# Patient Record
Sex: FEMALE | Race: WHITE | Employment: UNEMPLOYED | URBAN - METROPOLITAN AREA
[De-identification: names, ages, dates, MRNs, and addresses within clinical notes are randomized per-mention and may not be internally consistent; named-entity substitution may affect disease eponyms.]

---
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
## 2019-07-10 ENCOUNTER — OFFICE VISIT (OUTPATIENT)
Dept: FAMILY MEDICINE CLINIC | Age: 19
End: 2019-07-10
Payer: COMMERCIAL

## 2019-07-10 VITALS
OXYGEN SATURATION: 97 % | HEIGHT: 65 IN | TEMPERATURE: 98.2 F | WEIGHT: 187 LBS | HEART RATE: 82 BPM | BODY MASS INDEX: 31.16 KG/M2

## 2019-07-10 DIAGNOSIS — S05.01XA ABRASION OF RIGHT CORNEA, INITIAL ENCOUNTER: Primary | ICD-10-CM

## 2019-07-10 PROCEDURE — 99213 OFFICE O/P EST LOW 20 MIN: CPT | Performed by: PHYSICIAN ASSISTANT

## 2019-07-10 RX ORDER — ERYTHROMYCIN 5 MG/G
OINTMENT OPHTHALMIC
Qty: 1 TUBE | Refills: 0 | Status: SHIPPED
Start: 2019-07-10 | End: 2020-02-14

## 2019-07-10 SDOH — HEALTH STABILITY: MENTAL HEALTH: HOW OFTEN DO YOU HAVE A DRINK CONTAINING ALCOHOL?: NEVER

## 2020-02-14 ENCOUNTER — OFFICE VISIT (OUTPATIENT)
Dept: FAMILY MEDICINE CLINIC | Age: 20
End: 2020-02-14
Payer: COMMERCIAL

## 2020-02-14 VITALS
BODY MASS INDEX: 28 KG/M2 | SYSTOLIC BLOOD PRESSURE: 108 MMHG | HEART RATE: 61 BPM | WEIGHT: 164 LBS | HEIGHT: 64 IN | DIASTOLIC BLOOD PRESSURE: 65 MMHG

## 2020-02-14 PROCEDURE — 99213 OFFICE O/P EST LOW 20 MIN: CPT | Performed by: FAMILY MEDICINE

## 2020-02-14 ASSESSMENT — PATIENT HEALTH QUESTIONNAIRE - PHQ9
SUM OF ALL RESPONSES TO PHQ QUESTIONS 1-9: 1
SUM OF ALL RESPONSES TO PHQ9 QUESTIONS 1 & 2: 1
2. FEELING DOWN, DEPRESSED OR HOPELESS: 1
1. LITTLE INTEREST OR PLEASURE IN DOING THINGS: 0
SUM OF ALL RESPONSES TO PHQ QUESTIONS 1-9: 1

## 2020-08-17 ENCOUNTER — OFFICE VISIT (OUTPATIENT)
Dept: FAMILY MEDICINE CLINIC | Age: 20
End: 2020-08-17
Payer: COMMERCIAL

## 2020-08-17 VITALS
SYSTOLIC BLOOD PRESSURE: 118 MMHG | HEIGHT: 64 IN | WEIGHT: 153 LBS | OXYGEN SATURATION: 98 % | RESPIRATION RATE: 16 BRPM | DIASTOLIC BLOOD PRESSURE: 74 MMHG | TEMPERATURE: 98 F | BODY MASS INDEX: 26.12 KG/M2 | HEART RATE: 78 BPM

## 2020-08-17 PROCEDURE — 99213 OFFICE O/P EST LOW 20 MIN: CPT | Performed by: FAMILY MEDICINE

## 2020-08-17 RX ORDER — BUSPIRONE HYDROCHLORIDE 7.5 MG/1
7.5 TABLET ORAL 2 TIMES DAILY
Qty: 60 TABLET | Refills: 0 | Status: SHIPPED
Start: 2020-08-17 | End: 2020-08-17

## 2020-08-17 RX ORDER — BUSPIRONE HYDROCHLORIDE 7.5 MG/1
7.5 TABLET ORAL 2 TIMES DAILY
Qty: 60 TABLET | Refills: 0 | Status: SHIPPED | OUTPATIENT
Start: 2020-08-17 | End: 2020-09-16

## 2020-08-17 ASSESSMENT — ENCOUNTER SYMPTOMS
RHINORRHEA: 0
BACK PAIN: 0
SORE THROAT: 0
DIARRHEA: 0
WHEEZING: 0
NAUSEA: 0
ABDOMINAL PAIN: 0
COUGH: 0
VOMITING: 0
SHORTNESS OF BREATH: 0
CONSTIPATION: 0

## 2020-08-17 NOTE — PROGRESS NOTES
[facility]fan[facility] [address]  Precepting Note    Subjective: Anxiety  Has mood swings, anxious with school  Some flash backs  Some sadness and mad    ROS otherwise negative     Past medical, surgical, family and social history were reviewed, non-contributory, and unchanged unless otherwise stated. Objective:    /74   Pulse 78   Temp 98 °F (36.7 °C) (Temporal)   Resp 16   Ht 5' 4\" (1.626 m)   Wt 153 lb (69.4 kg)   SpO2 98%   BMI 26.26 kg/m²     Exam is as noted by resident with the following changes, additions or corrections:    General:  NAD; alert & oriented x 3   Heart:  RRR, no murmurs, gallops, or rubs. Lungs:  CTA bilaterally, no wheeze, rales or rhonchi  Mood: guarded, no SI/HI    Assessment/Plan:  Anxiety: add Buspar  Refer to psychology       Attending Physician Statement  I have reviewed the chart, including any radiology or labs. I have discussed the case, including pertinent history and exam findings with the resident. I agree with the assessment, plan and orders as documented by the resident. Please refer to the resident note for additional information.       Electronically signed by Karissa Rivera MD on 8/17/2020 at 2:29 PM

## 2021-07-08 ENCOUNTER — OFFICE VISIT (OUTPATIENT)
Dept: FAMILY MEDICINE CLINIC | Age: 21
End: 2021-07-08
Payer: COMMERCIAL

## 2021-07-08 VITALS
TEMPERATURE: 98 F | DIASTOLIC BLOOD PRESSURE: 60 MMHG | HEIGHT: 65 IN | SYSTOLIC BLOOD PRESSURE: 101 MMHG | WEIGHT: 145.8 LBS | BODY MASS INDEX: 24.29 KG/M2 | HEART RATE: 83 BPM

## 2021-07-08 DIAGNOSIS — R14.0 ABDOMINAL BLOATING: ICD-10-CM

## 2021-07-08 DIAGNOSIS — K21.9 GASTROESOPHAGEAL REFLUX DISEASE, UNSPECIFIED WHETHER ESOPHAGITIS PRESENT: Primary | ICD-10-CM

## 2021-07-08 PROCEDURE — 99213 OFFICE O/P EST LOW 20 MIN: CPT | Performed by: FAMILY MEDICINE

## 2021-07-08 RX ORDER — PANTOPRAZOLE SODIUM 40 MG/1
40 TABLET, DELAYED RELEASE ORAL
Qty: 30 TABLET | Refills: 1 | Status: SHIPPED
Start: 2021-07-08 | End: 2021-10-08 | Stop reason: SDUPTHER

## 2021-07-08 ASSESSMENT — PATIENT HEALTH QUESTIONNAIRE - PHQ9
SUM OF ALL RESPONSES TO PHQ9 QUESTIONS 1 & 2: 0
SUM OF ALL RESPONSES TO PHQ QUESTIONS 1-9: 0
1. LITTLE INTEREST OR PLEASURE IN DOING THINGS: 0
SUM OF ALL RESPONSES TO PHQ QUESTIONS 1-9: 0
SUM OF ALL RESPONSES TO PHQ QUESTIONS 1-9: 0
2. FEELING DOWN, DEPRESSED OR HOPELESS: 0

## 2021-07-08 NOTE — PROGRESS NOTES
7/9/2021    Williams Clifford is a 24 y.o. female here for:    HPI:    Having chronic abdominal pain: Not having diarrhea but just more frequent stools. She used to have problems with constipation. Had new IUD placed less than a year ago but is having regular light menses. Bloating in the morning and she will have to use the bathroom multiple times in the morning before the bloating will resolve and she can continue with her day. She has noticed blood on the toilet paper 2 times but no bloody stools otherwise. She is having significant burning and sour taste mostly in the mornings but occasionally in the evenings as well    COVID Vaccine Status: [employer]  BP Readings from Last 3 Encounters:   07/08/21 101/60   08/17/20 118/74   02/14/20 108/65     Current Outpatient Medications   Medication Sig Dispense Refill    pantoprazole (PROTONIX) 40 MG tablet Take 1 tablet by mouth daily (with breakfast) 30 tablet 1    levonorgestrel (NORRIS) IUD 13.5 mg 1 each by Intrauterine route once       No current facility-administered medications for this visit.       Allergies   Allergen Reactions    Amoxicillin Rash       Past Medical & Surgical History:      Diagnosis Date    Acne vulgaris 3/2/2018    Normal T, mild elevation in DHEAS    Irregular menses     Mechanical low back pain 5/29/2014     Past Surgical History:   Procedure Laterality Date    ADENOIDECTOMY         Family History:      Problem Relation Age of Onset    Cancer Father         thyroid       Social History:  Social History     Tobacco Use    Smoking status: Never Smoker    Smokeless tobacco: Never Used   Substance Use Topics    Alcohol use: Never       Immunization History   Administered Date(s) Administered    DTaP 1999, 1999, 1999, 10/10/2000    HPV Quadrivalent (Gardasil) 07/03/2014, 09/03/2014, 02/26/2015    Hepatitis A 08/02/2016    Hepatitis B 1999, 1999, 1999    Hib, unspecified 1999, edema. Left lower leg: No edema. Skin:     General: Skin is warm and dry. Neurological:      General: No focal deficit present. Mental Status: She is alert. Assessment/Plan:    1. Gastroesophageal reflux disease, unspecified whether esophagitis present  A lot of the description of her symptoms appear to be reflux in nature  May be component of IBS given history of constipation and now with more frequent stools although not diarrhea  Does not appear to be having abdominal spasm that would warrant antispasmodic at this point but may benefit from the future  No overt GI bleeding that would warrant inflammatory bowel disease work-up with scope at this point but pending response to PPI may benefit from at least upper endoscopy in the future  - pantoprazole (PROTONIX) 40 MG tablet; Take 1 tablet by mouth daily (with breakfast)  Dispense: 30 tablet; Refill: 1    2. Abdominal bloating  As above  Patient's abdomen was soft and nondistended today      Follow up: 2 months after course of PPI, discussion of further work-up or treatment at that time pending response to PPI    Patient agrees with the above stated plan.     Ina Rios MD  PGY-2 Family Medicine

## 2021-07-08 NOTE — PROGRESS NOTES
S: 24 y.o. female with   Chief Complaint   Patient presents with    Abdominal Pain     stomach cramps everyday, also feels acid build up in her throat, been going on for about 2 months     Urinary Frequency     when she has a lot of liquid she has urgency in urinating     Rectal Bleeding     overa the 3 month span she has noticed blood on TP about 3 times. All since this stomach issue has been going on        MONTHs of diarrhea and burning/reflux sx. +blood in stool. Hx constipation in past.  Only blood on TP only not in stool. O: VS:  height is 5' 5\" (1.651 m) and weight is 145 lb 12.8 oz (66.1 kg). Her temporal temperature is 98 °F (36.7 °C). Her blood pressure is 101/60 and her pulse is 83. BP Readings from Last 3 Encounters:   07/08/21 101/60   08/17/20 118/74   02/14/20 108/65     See resident note      Impression/Plan:   1) GI sx - Treat reflux       Health Maintenance Due   Topic Date Due    Hepatitis C screen  Never done    Varicella vaccine (2 of 2 - 2-dose childhood series) 12/10/2009    HIV screen  Never done    Chlamydia screen  Never done    Hepatitis A vaccine (2 of 2 - 2-dose series) 02/02/2017    Cervical cancer screen  Never done         Attending Physician Statement  I have discussed the case, including pertinent history and exam findings with the resident. I agree with the documented assessment and plan.       Ioana Saldivar MD

## 2021-07-09 ASSESSMENT — ENCOUNTER SYMPTOMS
NAUSEA: 0
ABDOMINAL DISTENTION: 1
CONSTIPATION: 0
SHORTNESS OF BREATH: 0
WHEEZING: 0
BLOOD IN STOOL: 0
DIARRHEA: 0
ABDOMINAL PAIN: 1
TROUBLE SWALLOWING: 0
VOMITING: 0
COUGH: 0
SORE THROAT: 0

## 2021-10-08 ENCOUNTER — OFFICE VISIT (OUTPATIENT)
Dept: FAMILY MEDICINE CLINIC | Age: 21
End: 2021-10-08
Payer: COMMERCIAL

## 2021-10-08 VITALS
RESPIRATION RATE: 16 BRPM | TEMPERATURE: 98.6 F | BODY MASS INDEX: 24.16 KG/M2 | HEART RATE: 87 BPM | WEIGHT: 145 LBS | OXYGEN SATURATION: 100 % | SYSTOLIC BLOOD PRESSURE: 120 MMHG | HEIGHT: 65 IN | DIASTOLIC BLOOD PRESSURE: 79 MMHG

## 2021-10-08 DIAGNOSIS — K21.9 GASTROESOPHAGEAL REFLUX DISEASE, UNSPECIFIED WHETHER ESOPHAGITIS PRESENT: ICD-10-CM

## 2021-10-08 DIAGNOSIS — F41.9 ANXIETY: Primary | ICD-10-CM

## 2021-10-08 DIAGNOSIS — K62.5 RECTAL BLEEDING: ICD-10-CM

## 2021-10-08 PROCEDURE — 99213 OFFICE O/P EST LOW 20 MIN: CPT | Performed by: FAMILY MEDICINE

## 2021-10-08 RX ORDER — PANTOPRAZOLE SODIUM 40 MG/1
40 TABLET, DELAYED RELEASE ORAL
Qty: 30 TABLET | Refills: 1 | Status: SHIPPED
Start: 2021-10-08 | End: 2022-01-31

## 2021-10-08 RX ORDER — SERTRALINE HYDROCHLORIDE 25 MG/1
25 TABLET, FILM COATED ORAL DAILY
Qty: 30 TABLET | Refills: 3 | Status: SHIPPED
Start: 2021-10-08 | End: 2021-11-05 | Stop reason: SDUPTHER

## 2021-10-08 RX ORDER — HYDROXYZINE PAMOATE 25 MG/1
25 CAPSULE ORAL 3 TIMES DAILY PRN
Qty: 30 CAPSULE | Refills: 1 | Status: SHIPPED
Start: 2021-10-08 | End: 2021-11-05

## 2021-10-08 SDOH — ECONOMIC STABILITY: FOOD INSECURITY: WITHIN THE PAST 12 MONTHS, YOU WORRIED THAT YOUR FOOD WOULD RUN OUT BEFORE YOU GOT MONEY TO BUY MORE.: NEVER TRUE

## 2021-10-08 SDOH — ECONOMIC STABILITY: FOOD INSECURITY: WITHIN THE PAST 12 MONTHS, THE FOOD YOU BOUGHT JUST DIDN'T LAST AND YOU DIDN'T HAVE MONEY TO GET MORE.: NEVER TRUE

## 2021-10-08 ASSESSMENT — SOCIAL DETERMINANTS OF HEALTH (SDOH): HOW HARD IS IT FOR YOU TO PAY FOR THE VERY BASICS LIKE FOOD, HOUSING, MEDICAL CARE, AND HEATING?: NOT HARD AT ALL

## 2021-10-08 ASSESSMENT — ENCOUNTER SYMPTOMS
ABDOMINAL PAIN: 0
DIARRHEA: 0
VOMITING: 0
COUGH: 0
NAUSEA: 0
CONSTIPATION: 0
SHORTNESS OF BREATH: 0

## 2021-10-08 NOTE — PROGRESS NOTES
Subjective:    Conchis Espinoza is here for rectal bleeding over the past two weeks. She has had bright red bleeding. She also has had some vomiting and nausea after eating a pepperoni roll. She is off of Omeprazole. She has no pain associated with the bleeding. ROS: Otherwise negative    Patient Active Problem List   Diagnosis    Irregular menses    Anxiety    Panic disorder    Acne vulgaris       Past medical, surgical, family and social history were reviewed, non-contributory, and unchanged unless otherwise stated. Objective:    /79   Pulse 87   Temp 98.6 °F (37 °C) (Temporal)   Resp 16   Ht 5' 5\" (1.651 m)   Wt 145 lb (65.8 kg)   SpO2 100%   BMI 24.13 kg/m²     Exam is as noted by resident with the following changes, additions or corrections:      Assessment/Plan:        Conchis Espinoza was seen today for rectal bleeding and anxiety. Diagnoses and all orders for this visit:    Anxiety  -     sertraline (ZOLOFT) 25 MG tablet; Take 1 tablet by mouth daily  -     hydrOXYzine (VISTARIL) 25 MG capsule; Take 1 capsule by mouth 3 times daily as needed for Anxiety    Gastroesophageal reflux disease, unspecified whether esophagitis present  -     pantoprazole (PROTONIX) 40 MG tablet; Take 1 tablet by mouth daily (with breakfast)    Rectal bleeding           Attending Physician Statement    I have reviewed the chart, including any radiology or labs. I have discussed the case, including pertinent history and exam findings with the resident. I agree with the assessment, plan and orders as documented by the resident. Please refer to the resident note for additional information.       Electronically signed by Sarah Doty DO on 10/14/2021 at 8:27 AM

## 2021-10-08 NOTE — PROGRESS NOTES
02/26/2015    Hepatitis A 08/02/2016    Hepatitis B 1999, 1999, 1999    Hib, unspecified 1999, 1999, 1999, 07/25/2000    MMR 07/25/2000, 05/24/2004    Meningococcal MCV4P (Menactra) 09/15/2011, 07/15/2015    Pneumococcal Conjugate 13-valent Clarisa Bale) 07/25/2000, 10/10/2000    Polio IPV (IPOL) 1999, 1999, 10/10/2000    Rotavirus Vaccine 1999    Tdap (Boostrix, Adacel) 09/15/2011    Varicella (Varivax) 04/25/2000       Review of Systems   Constitutional: Negative for appetite change and fatigue. HENT: Negative for congestion and sneezing. Respiratory: Negative for cough and shortness of breath. Cardiovascular: Negative for chest pain. Gastrointestinal: Positive for blood in stool. Negative for abdominal pain, constipation, diarrhea, nausea and vomiting. Genitourinary: Negative for dysuria. Neurological: Negative for dizziness, numbness and headaches. Psychiatric/Behavioral: The patient is nervous/anxious. VS:  /79   Pulse 87   Temp 98.6 °F (37 °C) (Temporal)   Resp 16   Ht 5' 5\" (1.651 m)   Wt 145 lb (65.8 kg)   SpO2 100%   BMI 24.13 kg/m²     Physical Exam  Vitals reviewed. Constitutional:       Appearance: Normal appearance. She is not ill-appearing. HENT:      Head: Normocephalic. Mouth/Throat:      Mouth: Mucous membranes are moist.   Eyes:      Pupils: Pupils are equal, round, and reactive to light. Cardiovascular:      Rate and Rhythm: Normal rate and regular rhythm. Heart sounds: Normal heart sounds. No murmur heard. Pulmonary:      Effort: Pulmonary effort is normal.      Breath sounds: Normal breath sounds. No wheezing or rhonchi. Musculoskeletal:      Right lower leg: No edema. Left lower leg: No edema. Skin:     General: Skin is warm. Capillary Refill: Capillary refill takes less than 2 seconds. Coloration: Skin is not pale.    Neurological:      Mental Status: She is alert and oriented to person, place, and time. Psychiatric:         Mood and Affect: Mood is anxious. Behavior: Behavior normal.         Thought Content: Thought content normal.         Judgment: Judgment normal.         Assessment/Plan:  1. Anxiety  Will start medication   - sertraline (ZOLOFT) 25 MG tablet; Take 1 tablet by mouth daily  Dispense: 30 tablet; Refill: 3  - hydrOXYzine (VISTARIL) 25 MG capsule; Take 1 capsule by mouth 3 times daily as needed for Anxiety  Dispense: 30 capsule; Refill: 1    2. Gastroesophageal reflux disease, unspecified whether esophagitis present  Refilled  - pantoprazole (PROTONIX) 40 MG tablet; Take 1 tablet by mouth daily (with breakfast)  Dispense: 30 tablet; Refill: 1    3. Rectal bleeding  Sitz or Epsom salt baths       Return to office: Return in about 4 weeks (around 11/5/2021) for Depression/Anxiety. Counseled regarding above diagnosis, including possible risks and complications, especially if left uncontrolled. I encourage you to do some reading and education about your health. The American Academy of [employer] provides information on many health conditions:http://familydoctor. org     Counseled regarding the possible side effects, risks, benefits and alternatives to treatment; patient and/or guardian verbalizes understanding, agrees, feels comfortable with and wishes to proceed with above treatment plan. Advised patient to call with any new medication issues, and read all Rx info from pharmacy to assure aware of all possible risks and side effects of medication before taking. Reviewed age and gender appropriate health screening exams and vaccinations. Advised patient regarding importance of keeping up with recommended health maintenance and to schedule as soon as possible if overdue, as this is important in assessing for undiagnosed pathology, especially cancer, as well as protecting against potentially harmful/life threatening disease. Patient and/or guardian verbalizes understanding and agrees with above counseling, assessment and plan.      Cata Echavarria MD  Family Medicine   PGY-3

## 2021-10-08 NOTE — PATIENT INSTRUCTIONS
Start Sitz baths and Epsom salt baths for rectal bleeding     Patient Education        Rectal Bleeding: Care Instructions  Your Care Instructions     Rectal bleeding in small amounts is common. You may see red spotting on toilet paper or drops of blood in the toilet. Rectal bleeding has many possible causes, from something as minor as hemorrhoids to something as serious as colon cancer. You may need more tests to find the cause of your bleeding. Follow-up care is a key part of your treatment and safety. Be sure to make and go to all appointments, and call your doctor if you are having problems. It's also a good idea to know your test results and keep a list of the medicines you take. How can you care for yourself at home? · Avoid aspirin and other nonsteroidal anti-inflammatory drugs (NSAIDs), such as ibuprofen (Advil, Motrin) and naproxen (Aleve). They can cause you to bleed more. Ask your doctor if you can take acetaminophen (Tylenol). Read and follow all instructions on the label. · Use a stool softener that contains bran or psyllium. You can save money by buying bran or psyllium (available in bulk at most health food stores) and sprinkling it on foods or stirring it into fruit juice. You can also use a product such as Metamucil or Citrucel. · Take your medicines exactly as directed. Call your doctor if you think you are having a problem with your medicine. When should you call for help? Call 911 anytime you think you may need emergency care. For example, call if:    · You passed out (lost consciousness). Call your doctor now or seek immediate medical care if:    · You have new or worse pain.     · You have new or worse bleeding from the rectum.     · You are dizzy or light-headed, or you feel like you may faint. Watch closely for changes in your health, and be sure to contact your doctor if:    · You cannot pass stools or gas.     · You do not get better as expected. Where can you learn more?   Go to [email or web address] and sign in to your [employer] account. Enter L082 in the [facility] box to learn more about \"Rectal Bleeding: Care Instructions. \"     If you do not have an account, please click on the \"Sign Up Now\" link. Current as of: February 10, 2021               Content Version: 13.0  © 3451-3762 [employer], [facility]. Care instructions adapted under license by [facility] ([facility]). If you have questions about a medical condition or this instruction, always ask your healthcare professional. [place] [address] any warranty or liability for your use of this information.

## 2021-10-11 ASSESSMENT — ENCOUNTER SYMPTOMS: BLOOD IN STOOL: 1

## 2021-10-27 ENCOUNTER — HOSPITAL ENCOUNTER (EMERGENCY)
Age: 21
Discharge: HOME | End: 2021-10-27
Attending: STUDENT IN AN ORGANIZED HEALTH CARE EDUCATION/TRAINING PROGRAM
Payer: COMMERCIAL

## 2021-10-27 VITALS
BODY MASS INDEX: 25.44 KG/M2 | SYSTOLIC BLOOD PRESSURE: 117 MMHG | TEMPERATURE: 97 F | WEIGHT: 149 LBS | OXYGEN SATURATION: 99 % | HEART RATE: 70 BPM | DIASTOLIC BLOOD PRESSURE: 66 MMHG | RESPIRATION RATE: 16 BRPM | HEIGHT: 64 IN

## 2021-10-27 DIAGNOSIS — K62.5 RECTAL BLEEDING: Primary | ICD-10-CM

## 2021-10-27 DIAGNOSIS — K64.9 HEMORRHOIDS, UNSPECIFIED HEMORRHOID TYPE: ICD-10-CM

## 2021-10-27 LAB
ALBUMIN SERPL-MCNC: 5 G/DL (ref 3.5–5.2)
ALP BLD-CCNC: 53 U/L (ref 35–104)
ALT SERPL-CCNC: 18 U/L (ref 0–32)
ANION GAP SERPL CALCULATED.3IONS-SCNC: 11 MMOL/L (ref 7–16)
AST SERPL-CCNC: 18 U/L (ref 0–31)
BACTERIA: ABNORMAL /HPF
BASOPHILS ABSOLUTE: 0.03 E9/L (ref 0–0.2)
BASOPHILS RELATIVE PERCENT: 0.4 % (ref 0–2)
BILIRUB SERPL-MCNC: 0.8 MG/DL (ref 0–1.2)
BILIRUBIN URINE: NEGATIVE
BLOOD, URINE: ABNORMAL
BUN BLDV-MCNC: 9 MG/DL (ref 6–20)
CALCIUM SERPL-MCNC: 9.7 MG/DL (ref 8.6–10.2)
CHLORIDE BLD-SCNC: 104 MMOL/L (ref 98–107)
CLARITY: CLEAR
CO2: 26 MMOL/L (ref 22–29)
COLOR: YELLOW
CREAT SERPL-MCNC: 0.7 MG/DL (ref 0.5–1)
EOSINOPHILS ABSOLUTE: 0.31 E9/L (ref 0.05–0.5)
EOSINOPHILS RELATIVE PERCENT: 4 % (ref 0–6)
EPITHELIAL CELLS, UA: ABNORMAL /HPF
GFR AFRICAN AMERICAN: >60
GFR NON-AFRICAN AMERICAN: >60 ML/MIN/1.73
GLUCOSE BLD-MCNC: 89 MG/DL (ref 74–99)
GLUCOSE URINE: NEGATIVE MG/DL
HCG(URINE) PREGNANCY TEST: NEGATIVE
HCT VFR BLD CALC: 42.2 % (ref 34–48)
HEMOGLOBIN: 14.2 G/DL (ref 11.5–15.5)
IMMATURE GRANULOCYTES #: 0.02 E9/L
IMMATURE GRANULOCYTES %: 0.3 % (ref 0–5)
KETONES, URINE: NEGATIVE MG/DL
LEUKOCYTE ESTERASE, URINE: NEGATIVE
LYMPHOCYTES ABSOLUTE: 2.16 E9/L (ref 1.5–4)
LYMPHOCYTES RELATIVE PERCENT: 27.8 % (ref 20–42)
MCH RBC QN AUTO: 30.4 PG (ref 26–35)
MCHC RBC AUTO-ENTMCNC: 33.6 % (ref 32–34.5)
MCV RBC AUTO: 90.4 FL (ref 80–99.9)
MONOCYTES ABSOLUTE: 0.44 E9/L (ref 0.1–0.95)
MONOCYTES RELATIVE PERCENT: 5.7 % (ref 2–12)
NEUTROPHILS ABSOLUTE: 4.82 E9/L (ref 1.8–7.3)
NEUTROPHILS RELATIVE PERCENT: 61.8 % (ref 43–80)
NITRITE, URINE: NEGATIVE
PDW BLD-RTO: 11.9 FL (ref 11.5–15)
PH UA: 7 (ref 5–9)
PLATELET # BLD: 257 E9/L (ref 130–450)
PMV BLD AUTO: 9.4 FL (ref 7–12)
POTASSIUM REFLEX MAGNESIUM: 3.8 MMOL/L (ref 3.5–5)
PROTEIN UA: NEGATIVE MG/DL
RBC # BLD: 4.67 E12/L (ref 3.5–5.5)
RBC UA: ABNORMAL /HPF (ref 0–2)
SODIUM BLD-SCNC: 141 MMOL/L (ref 132–146)
SPECIFIC GRAVITY UA: 1.02 (ref 1–1.03)
TOTAL PROTEIN: 7.5 G/DL (ref 6.4–8.3)
UROBILINOGEN, URINE: 0.2 E.U./DL
WBC # BLD: 7.8 E9/L (ref 4.5–11.5)
WBC UA: ABNORMAL /HPF (ref 0–5)

## 2021-10-27 PROCEDURE — 85025 COMPLETE CBC W/AUTO DIFF WBC: CPT

## 2021-10-27 PROCEDURE — 81025 URINE PREGNANCY TEST: CPT

## 2021-10-27 PROCEDURE — 36415 COLL VENOUS BLD VENIPUNCTURE: CPT

## 2021-10-27 PROCEDURE — 81001 URINALYSIS AUTO W/SCOPE: CPT

## 2021-10-27 PROCEDURE — 80053 COMPREHEN METABOLIC PANEL: CPT

## 2021-10-27 PROCEDURE — 99283 EMERGENCY DEPT VISIT LOW MDM: CPT

## 2021-10-27 ASSESSMENT — PAIN SCALES - GENERAL: PAINLEVEL_OUTOF10: 5

## 2021-10-27 ASSESSMENT — PAIN DESCRIPTION - LOCATION: LOCATION: ABDOMEN

## 2021-10-27 ASSESSMENT — PAIN DESCRIPTION - ORIENTATION: ORIENTATION: RIGHT;LEFT;LOWER

## 2021-10-27 ASSESSMENT — PAIN DESCRIPTION - DESCRIPTORS: DESCRIPTORS: CRAMPING

## 2021-10-27 ASSESSMENT — PAIN DESCRIPTION - PAIN TYPE: TYPE: ACUTE PAIN

## 2021-10-27 NOTE — ED PROVIDER NOTES
Department of Emergency Medicine   ED  Provider Note  Admit Date/RoomTime: 10/27/2021 10:50 AM  ED Room: 05/05          History of Present Illness:  10/27/21, Time: 11:24 AM EDT  Chief Complaint   Patient presents with    Rectal Bleeding     Pt with intermittent abdominal pain for about 6 months, occasional small amounts of blood on toilet paper. Today had bright red blood in toilet            Leann Mark is a 24 y.o. female presenting to the ED for rectal bleeding, beginning six months ago. The complaint has been intermittent, mild in severity, and worsened by nothing. The patient is a 79-year-old female who presents to the emergency department complaining of rectal bleeding. Patient symptoms are sudden onset about 6 months ago, has been intermittent, mild in severity, nothing makes it better or worse. Patient states that when she wipes occasionally there are small amounts of blood on the toilet paper, but today she was concerned because the toilet bowl had bright red blood in the toilet. She states that she is currently on her menstrual period, but today is the last day of her period. She states that she was worried about the amount of blood that she saw in the toilet. She denies any lightheadedness, dizziness, nausea, vomiting, diarrhea, constipation, syncope, chest pain, shortness of breath, abdominal pain, known history of hemorrhoids or anal fissures, recent hospitalization, recent illness, or other acute symptoms or concerns. She is on any blood thinners.   She states that she has not followed up with GI in the past.    Review of Systems:   A complete review of systems was performed and pertinent positives and negatives are stated within HPI, all other systems reviewed and are negative.        --------------------------------------------- PAST HISTORY ---------------------------------------------  Past Medical History:  has a past medical history of Acne vulgaris, Irregular menses, and Mechanical low back pain. Past Surgical History:  has a past surgical history that includes Adenoidectomy. Social History:  reports that she has never smoked. She has never used smokeless tobacco. She reports that she does not drink alcohol and does not use drugs. Family History: family history includes Cancer in her father. . Unless otherwise noted, family history is non contributory    The patients home medications have been reviewed. Allergies: Amoxicillin    I have reviewed the past medical history, past surgical history, social history, and family history    ---------------------------------------------------PHYSICAL EXAM--------------------------------------    Constitutional/General: Alert and oriented x3  Head: Normocephalic and atraumatic  Eyes:  EOMI, sclera non icteric  ENT: Oropharynx clear, handling secretions, no trismus, no asymmetry of the posterior oropharynx or uvular edema  Neck: Supple, full ROM, no stridor, no meningeal signs  Respiratory: Lungs clear to auscultation bilaterally, no wheezes, rales, or rhonchi. Not in respiratory distress  Cardiovascular:  Regular rate. Regular rhythm. No murmurs, no gallops, no rubs. Gastrointestinal:  Abdomen Soft, Non tender, Non distended. No rebound, guarding, or rigidity. No pulsatile masses. : There is a small external hemorrhoid at the 6 o'clock position with no evidence of thrombosis or active bleeding at this time. Musculoskeletal: Moves all extremities x 4. Warm and well perfused, no clubbing, no cyanosis, no edema. Capillary refill <3 seconds  Skin: skin warm and dry. No rashes. Neurologic: GCS 15, no focal deficits, symmetric strength 5/5 in the upper and lower extremities bilaterally  Psychiatric: Normal Affect          -------------------------------------------------- RESULTS -------------------------------------------------  I have personally reviewed all laboratory and imaging results for this patient.  Results are listed below.     LABS: (Lab results interpreted by me)  Results for orders placed or performed during the hospital encounter of 10/27/21   CBC auto differential   Result Value Ref Range    WBC 7.8 4.5 - 11.5 E9/L    RBC 4.67 3.50 - 5.50 E12/L    Hemoglobin 14.2 11.5 - 15.5 g/dL    Hematocrit 42.2 34.0 - 48.0 %    MCV 90.4 80.0 - 99.9 fL    MCH 30.4 26.0 - 35.0 pg    MCHC 33.6 32.0 - 34.5 %    RDW 11.9 11.5 - 15.0 fL    Platelets 607 304 - 667 E9/L    MPV 9.4 7.0 - 12.0 fL    Neutrophils % 61.8 43.0 - 80.0 %    Immature Granulocytes % 0.3 0.0 - 5.0 %    Lymphocytes % 27.8 20.0 - 42.0 %    Monocytes % 5.7 2.0 - 12.0 %    Eosinophils % 4.0 0.0 - 6.0 %    Basophils % 0.4 0.0 - 2.0 %    Neutrophils Absolute 4.82 1.80 - 7.30 E9/L    Immature Granulocytes # 0.02 E9/L    Lymphocytes Absolute 2.16 1.50 - 4.00 E9/L    Monocytes Absolute 0.44 0.10 - 0.95 E9/L    Eosinophils Absolute 0.31 0.05 - 0.50 E9/L    Basophils Absolute 0.03 0.00 - 0.20 E9/L   Comprehensive Metabolic Panel w/ Reflex to MG   Result Value Ref Range    Sodium 141 132 - 146 mmol/L    Potassium reflex Magnesium 3.8 3.5 - 5.0 mmol/L    Chloride 104 98 - 107 mmol/L    CO2 26 22 - 29 mmol/L    Anion Gap 11 7 - 16 mmol/L    Glucose 89 74 - 99 mg/dL    BUN 9 6 - 20 mg/dL    CREATININE 0.7 0.5 - 1.0 mg/dL    GFR Non-African American >60 >=60 mL/min/1.73    GFR African American >60     Calcium 9.7 8.6 - 10.2 mg/dL    Total Protein 7.5 6.4 - 8.3 g/dL    Albumin 5.0 3.5 - 5.2 g/dL    Total Bilirubin 0.8 0.0 - 1.2 mg/dL    Alkaline Phosphatase 53 35 - 104 U/L    ALT 18 0 - 32 U/L    AST 18 0 - 31 U/L   Urinalysis with Microscopic   Result Value Ref Range    Color, UA Yellow Straw/Yellow    Clarity, UA Clear Clear    Glucose, Ur Negative Negative mg/dL    Bilirubin Urine Negative Negative    Ketones, Urine Negative Negative mg/dL    Specific Gravity, UA 1.020 1.005 - 1.030    Blood, Urine TRACE-INTACT Negative    pH, UA 7.0 5.0 - 9.0    Protein, UA Negative Negative mg/dL Urobilinogen, Urine 0.2 <2.0 E.U./dL    Nitrite, Urine Negative Negative    Leukocyte Esterase, Urine Negative Negative    WBC, UA 1-3 0 - 5 /HPF    RBC, UA 10-20 (A) 0 - 2 /HPF    Epithelial Cells, UA MODERATE /HPF    Bacteria, UA MODERATE (A) None Seen /HPF   Pregnancy, urine   Result Value Ref Range    HCG(Urine) Pregnancy Test NEGATIVE NEGATIVE   ,       RADIOLOGY:  Interpreted by Radiologist unless otherwise specified  No orders to display         ------------------------- NURSING NOTES AND VITALS REVIEWED ---------------------------   The nursing notes within the ED encounter and vital signs as below have been reviewed by myself  /66   Pulse 70   Temp 97 °F (36.1 °C) (Temporal)   Resp 16   Ht 5' 4\" (1.626 m)   Wt 149 lb (67.6 kg)   SpO2 99%   BMI 25.58 kg/m²     Oxygen Saturation Interpretation: Normal    The patients available past medical records and past encounters were reviewed. ------------------------------ ED COURSE/MEDICAL DECISION MAKING----------------------  Medications - No data to display       IDr. Jerry, am the primary provider of record    Medical Decision Making:   The patient is a 71-year-old female who presents to the emergency department complaining of rectal bleeding. The patient is hemodynamically stable, nontoxic in appearance, and in no acute distress. Labs are reassuring within normal limits, hemoglobin was over 14. Rectal exam did show a small hemorrhoid at the 6 o'clock position. There was no evidence of active bleeding or anal fissure present. Discussed with her that she needs to follow-up with GI and her family doctor. She may need a colonoscopy for further evaluation. Since there was no active bleeding from the hemorrhoid uncertain if this is what was actually causing her rectal bleeding. She states that she did have a history of constipation in the past and now has been having bowel movements daily, did discuss with her that she may have IBS.   She

## 2021-11-05 ENCOUNTER — OFFICE VISIT (OUTPATIENT)
Dept: FAMILY MEDICINE CLINIC | Age: 21
End: 2021-11-05
Payer: COMMERCIAL

## 2021-11-05 VITALS
OXYGEN SATURATION: 98 % | DIASTOLIC BLOOD PRESSURE: 60 MMHG | TEMPERATURE: 97.8 F | HEART RATE: 77 BPM | RESPIRATION RATE: 16 BRPM | BODY MASS INDEX: 25.1 KG/M2 | SYSTOLIC BLOOD PRESSURE: 108 MMHG | HEIGHT: 64 IN | WEIGHT: 147 LBS

## 2021-11-05 DIAGNOSIS — K92.1 HEMATOCHEZIA: ICD-10-CM

## 2021-11-05 DIAGNOSIS — F41.9 ANXIETY: Primary | ICD-10-CM

## 2021-11-05 DIAGNOSIS — Z23 NEED FOR INFLUENZA VACCINATION: ICD-10-CM

## 2021-11-05 PROCEDURE — 99214 OFFICE O/P EST MOD 30 MIN: CPT | Performed by: FAMILY MEDICINE

## 2021-11-05 PROCEDURE — 90471 IMMUNIZATION ADMIN: CPT | Performed by: FAMILY MEDICINE

## 2021-11-05 PROCEDURE — 90674 CCIIV4 VAC NO PRSV 0.5 ML IM: CPT | Performed by: FAMILY MEDICINE

## 2021-11-05 RX ORDER — SERTRALINE HYDROCHLORIDE 25 MG/1
25 TABLET, FILM COATED ORAL DAILY
Qty: 30 TABLET | Refills: 3 | Status: SHIPPED
Start: 2021-11-05 | End: 2022-02-08 | Stop reason: SDUPTHER

## 2021-11-05 NOTE — PROGRESS NOTES
11/5/21    Sandra Wilson is a 24 y.o. female here for:    HPI:    Anxiety  On Zoloft 25 mg  Hasn't needed to use Vistaril  Got a puppy, so not sleeping much  No racing thoughts   Medication working well  No side effects  Everything falling in place with her relationship and school  Almost done with school     Rectal bleeding   Still having rectal pain with bowel movements intermittently   Whole toilet filled with blood a few days ago   Pepe Fontenot to ER- small hemorrhoid found    BP Readings from Last 3 Encounters:   11/05/21 108/60   10/27/21 117/66   10/08/21 120/79     Current Outpatient Medications   Medication Sig Dispense Refill    sertraline (ZOLOFT) 25 MG tablet Take 1 tablet by mouth daily 30 tablet 3    pantoprazole (PROTONIX) 40 MG tablet Take 1 tablet by mouth daily (with breakfast) 30 tablet 1    levonorgestrel (NORRIS) IUD 13.5 mg 1 each by Intrauterine route once       No current facility-administered medications for this visit.       Allergies   Allergen Reactions    Amoxicillin Rash       Past Medical & Surgical History:      Diagnosis Date    Acne vulgaris 3/2/2018    Normal T, mild elevation in DHEAS    Irregular menses     Mechanical low back pain 5/29/2014     Past Surgical History:   Procedure Laterality Date    ADENOIDECTOMY         Family History:      Problem Relation Age of Onset    Cancer Father         thyroid       Social History:  Social History     Tobacco Use    Smoking status: Never Smoker    Smokeless tobacco: Never Used   Substance Use Topics    Alcohol use: Never       Immunization History   Administered Date(s) Administered    COVID-19, J&J, PF, 0.5 mL 04/05/2021    DTaP 1999, 1999, 1999, 10/10/2000    HPV Quadrivalent (Gardasil) 07/03/2014, 09/03/2014, 02/26/2015    Hepatitis A 08/02/2016    Hepatitis B 1999, 1999, 1999    Hib, unspecified 1999, 1999, 1999, 07/25/2000    Influenza, MDCK Quadv, IM, PF (Flucelvax 2 yrs and older) 11/05/2021    MMR 07/25/2000, 05/24/2004    Meningococcal MCV4P (Menactra) 09/15/2011, 07/15/2015    Pneumococcal Conjugate 13-valent Burnclyde Barbaramargareth) 07/25/2000, 10/10/2000    Polio IPV (IPOL) 1999, 1999, 10/10/2000    Rotavirus Vaccine 1999    Tdap (Boostrix, Adacel) 09/15/2011    Varicella (Varivax) 04/25/2000       Review of Systems   Constitutional: Negative for appetite change, chills, fatigue and fever. HENT: Negative for congestion and sneezing. Respiratory: Negative for cough and shortness of breath. Cardiovascular: Negative for chest pain. Gastrointestinal: Positive for anal bleeding and blood in stool. Negative for abdominal pain, constipation, diarrhea, nausea and vomiting. Genitourinary: Negative for dysuria. Neurological: Negative for dizziness, numbness and headaches. Psychiatric/Behavioral: Negative for dysphoric mood. The patient is not nervous/anxious. VS:  /60   Pulse 77   Temp 97.8 °F (36.6 °C) (Temporal)   Resp 16   Ht 5' 4\" (1.626 m)   Wt 147 lb (66.7 kg)   SpO2 98%   BMI 25.23 kg/m²     Physical Exam  Vitals reviewed. Constitutional:       Appearance: Normal appearance. She is not ill-appearing. HENT:      Head: Normocephalic. Mouth/Throat:      Mouth: Mucous membranes are moist.   Eyes:      Pupils: Pupils are equal, round, and reactive to light. Cardiovascular:      Rate and Rhythm: Normal rate and regular rhythm. Heart sounds: Normal heart sounds. No murmur heard. Pulmonary:      Effort: Pulmonary effort is normal.      Breath sounds: Normal breath sounds. No wheezing or rhonchi. Musculoskeletal:      Right lower leg: No edema. Left lower leg: No edema. Skin:     General: Skin is warm. Capillary Refill: Capillary refill takes less than 2 seconds. Coloration: Skin is not pale. Neurological:      Mental Status: She is alert and oriented to person, place, and time.    Psychiatric: Mood and Affect: Mood normal.         Behavior: Behavior normal.         Thought Content: Thought content normal.         Judgment: Judgment normal.         Assessment/Plan:  1. Anxiety  Improving; continue Zoloft   - sertraline (ZOLOFT) 25 MG tablet; Take 1 tablet by mouth daily  Dispense: 30 tablet; Refill: 3    2. Hematochezia  Will send to GI to investigate cause of GI bleeding  - Parul Mosqueda MD, Gastroenterology, [place]    3. Need for influenza vaccination  - INFLUENZA, MDCK QUADV, 2 YRS AND OLDER, IM, PF, PREFILL SYR OR SDV, 0.5ML (FLUCELVAX QUADV, PF)      Return to office: Return in about 3 months (around 2/5/2022) for Depression/Anxiety. Counseled regarding above diagnosis, including possible risks and complications, especially if left uncontrolled. I encourage you to do some reading and education about your health. The American Academy of Family Physicians provides information on many health conditions:http://familydoctor. org     Counseled regarding the possible side effects, risks, benefits and alternatives to treatment; patient and/or guardian verbalizes understanding, agrees, feels comfortable with and wishes to proceed with above treatment plan. Advised patient to call with any new medication issues, and read all Rx info from pharmacy to assure aware of all possible risks and side effects of medication before taking. Reviewed age and gender appropriate health screening exams and vaccinations. Advised patient regarding importance of keeping up with recommended health maintenance and to schedule as soon as possible if overdue, as this is important in assessing for undiagnosed pathology, especially cancer, as well as protecting against potentially harmful/life threatening disease. Patient and/or guardian verbalizes understanding and agrees with above counseling, assessment and plan.      Neena Olvera MD  Family Medicine   PGY-3

## 2021-11-05 NOTE — PROGRESS NOTES
S: 24 y.o. female with   Chief Complaint   Patient presents with    Depression     per patient she is feeling better        Pt is here for anxiety follow up. She got a puppy. She has some blood in her stool. She went to ER and was told she had a hernia. O: VS:  height is 5' 4\" (1.626 m) and weight is 147 lb (66.7 kg). Her temporal temperature is 97.8 °F (36.6 °C). Her blood pressure is 108/60 and her pulse is 77. Her respiration is 16 and oxygen saturation is 98%. BP Readings from Last 3 Encounters:   11/05/21 108/60   10/27/21 117/66   10/08/21 120/79     See resident note      Impression/Plan:   1) anxiety - cont on current meds. 2) blood in stool - to GI. 3) Prev - offer flu. Get pap records. Health Maintenance Due   Topic Date Due    Hepatitis C screen  Never done    Varicella vaccine (2 of 2 - 2-dose childhood series) 12/10/2009    HIV screen  Never done    Chlamydia screen  Never done    Hepatitis A vaccine (2 of 2 - 2-dose series) 02/02/2017    Pap smear  Never done    Flu vaccine (1) 09/01/2021    DTaP/Tdap/Td vaccine (6 - Td or Tdap) 09/15/2021         Attending Physician Statement  I have discussed the case, including pertinent history and exam findings with the resident. I agree with the documented assessment and plan.       Blanche Merida MD

## 2021-11-07 ASSESSMENT — ENCOUNTER SYMPTOMS
CONSTIPATION: 0
SHORTNESS OF BREATH: 0
NAUSEA: 0
VOMITING: 0
DIARRHEA: 0
ABDOMINAL PAIN: 0
BLOOD IN STOOL: 1
ANAL BLEEDING: 1
COUGH: 0

## 2021-11-22 ENCOUNTER — TELEPHONE (OUTPATIENT)
Dept: GASTROENTEROLOGY | Age: 21
End: 2021-11-22

## 2021-11-22 NOTE — TELEPHONE ENCOUNTER
Received a call from the patient's mom who is wanting to schedule the appt with Dr. Joceline Almazan. The patient wants [facility] appt. Scheduled her on 1/27/22 at 3pm in [facility] office bring ID, medication list and insurance card. Gave the directions to the office. The mom verbalized understanding.   Electronically signed by Charissa Barajas on 11/22/2021 at 12:53 PM

## 2022-01-31 ENCOUNTER — OFFICE VISIT (OUTPATIENT)
Dept: GASTROENTEROLOGY | Age: 22
End: 2022-01-31
Payer: COMMERCIAL

## 2022-01-31 VITALS
WEIGHT: 147 LBS | HEART RATE: 88 BPM | BODY MASS INDEX: 25.1 KG/M2 | OXYGEN SATURATION: 98 % | SYSTOLIC BLOOD PRESSURE: 123 MMHG | HEIGHT: 64 IN | DIASTOLIC BLOOD PRESSURE: 77 MMHG

## 2022-01-31 DIAGNOSIS — K62.5 RECTAL BLEEDING: Primary | ICD-10-CM

## 2022-01-31 PROCEDURE — 99202 OFFICE O/P NEW SF 15 MIN: CPT | Performed by: NURSE PRACTITIONER

## 2022-01-31 RX ORDER — HYDROCORTISONE ACETATE 25 MG/1
25 SUPPOSITORY RECTAL EVERY 12 HOURS
Qty: 20 SUPPOSITORY | Refills: 1 | Status: SHIPPED | OUTPATIENT
Start: 2022-01-31

## 2022-01-31 NOTE — PROGRESS NOTES
Chinyere Whelan (:  2000) is a 24 y.o. female, here for evaluation of the following chief complaint(s):  New Patient (ref from dr Edu Cox for hematochezia)      SUBJECTIVE/OBJECTIVE:  HPI:    Miguel Travis is a very pleasant 24year old female that presents today with complaints of rectal pain with blood on the toilet paper and in the toilet. Presented to the ER on at least one occasion for the rectal bleeding. [facility] in the ER noted a hemorrhoid at 6 o'clock. Uses OTC hemorrhoid cream on occasion. There is rectal pain associated. She will lay in the tub to help alleviate the  rectal pain. Stools alternate between hard and soft. Has a BM in the morning. Mom states her daughter had issues in high school with constipation. Not a smoker. Drinks alcohol on occasion. Takes advil about once a week. Denies weight loss, changes in appetite, fevers, or abdominal pain. ROS:  General: Patient denies n/v/f/c or weight loss. HEENT: Patient denies persistent postnasal drip, scleral icterus, drooling, persistent bleeding from nose/mouth. Resp: Patient denies SOB, wheezing, productive cough. Cards: Patient denies CP, palpitations, significant edema  GI: As above. Derm: Patient denies jaundice/rashes. Musc: Patient denies diffuse/irregular joint swelling or myalgias. Objective   Wt Readings from Last 3 Encounters:   22 147 lb (66.7 kg)   21 147 lb (66.7 kg)   10/27/21 149 lb (67.6 kg)     Temp Readings from Last 3 Encounters:   21 97.8 °F (36.6 °C) (Temporal)   10/27/21 97 °F (36.1 °C) (Temporal)   10/08/21 98.6 °F (37 °C) (Temporal)     BP Readings from Last 3 Encounters:   22 123/77   21 108/60   10/27/21 117/66     Pulse Readings from Last 3 Encounters:   22 88   21 77   10/27/21 70        Physical Exam  Abdominal:      General: Bowel sounds are normal.      Palpations: Abdomen is soft.          Past Medical History:   Diagnosis Date    Acne vulgaris 3/2/2018    Normal T, mild elevation in DHEAS    Irregular menses     Mechanical low back pain 5/29/2014      Past Surgical History:   Procedure Laterality Date    ADENOIDECTOMY      TYMPANOSTOMY TUBE PLACEMENT        Family History   Problem Relation Age of Onset    Cancer Father         thyroid        Lab Results   Component Value Date    WBC 7.8 10/27/2021    HGB 14.2 10/27/2021    HCT 42.2 10/27/2021    MCV 90.4 10/27/2021     10/27/2021      Lab Results   Component Value Date     10/27/2021    K 3.8 10/27/2021     10/27/2021    CO2 26 10/27/2021    BUN 9 10/27/2021    CREATININE 0.7 10/27/2021    GLUCOSE 89 10/27/2021    CALCIUM 9.7 10/27/2021    PROT 7.5 10/27/2021    LABALBU 5.0 10/27/2021    BILITOT 0.8 10/27/2021    ALKPHOS 53 10/27/2021    AST 18 10/27/2021    ALT 18 10/27/2021    LABGLOM >60 10/27/2021    GFRAA >60 10/27/2021                       ASSESSMENT/PLAN:    1. Rectal bleeding      Most likely related to hemorrhoids. Anusol rectal suppository prescribed with instructions. We reviewed the alarm signs of CRC and IBD today. The importance of maintaining soft stools discussed. Patient will follow up in May for reevaluation. Return if symptoms worsen or fail to improve. An electronic signature was used to authenticate this note.     --Jennifer Castillo, APRN - CNP

## 2022-02-08 ENCOUNTER — OFFICE VISIT (OUTPATIENT)
Dept: FAMILY MEDICINE CLINIC | Age: 22
End: 2022-02-08
Payer: COMMERCIAL

## 2022-02-08 VITALS
RESPIRATION RATE: 16 BRPM | HEIGHT: 64 IN | SYSTOLIC BLOOD PRESSURE: 124 MMHG | TEMPERATURE: 98.2 F | DIASTOLIC BLOOD PRESSURE: 67 MMHG | WEIGHT: 153 LBS | HEART RATE: 85 BPM | OXYGEN SATURATION: 99 % | BODY MASS INDEX: 26.12 KG/M2

## 2022-02-08 DIAGNOSIS — F41.9 ANXIETY: ICD-10-CM

## 2022-02-08 PROCEDURE — 99213 OFFICE O/P EST LOW 20 MIN: CPT | Performed by: FAMILY MEDICINE

## 2022-02-08 NOTE — PROGRESS NOTES
S: 24 y.o. female with   Chief Complaint   Patient presents with    Anxiety    Depression       Zoloft 25mg, +racing thoughts/headaches/sleep disturbance, no SI/HI, wants to increase dose  Recently off THC  Stressors in the Fall--schooling, relationship, puppy; improved  Looking for new job    O: VS:  height is 5' 4\" (1.626 m) and weight is 153 lb (69.4 kg). Her temporal temperature is 98.2 °F (36.8 °C). Her blood pressure is 124/67 and her pulse is 85. Her respiration is 16 and oxygen saturation is 99%. BP Readings from Last 3 Encounters:   02/08/22 124/67   01/31/22 123/77   11/05/21 108/60     See resident note      Impression/Plan:   1) Anxiety: Increase zoloft to 50mg, f/u 6 weeks        Health Maintenance Due   Topic Date Due    Hepatitis C screen  Never done    Varicella vaccine (2 of 2 - 2-dose childhood series) 12/10/2009    HIV screen  Never done    Chlamydia screen  Never done    Hepatitis A vaccine (2 of 2 - 2-dose series) 02/02/2017    Pap smear  Never done    DTaP/Tdap/Td vaccine (6 - Td or Tdap) 09/15/2021         Attending Physician Statement  I have discussed the case, including pertinent history and exam findings with the resident. I agree with the documented assessment and plan.       Jeremi Peters MD

## 2022-02-08 NOTE — PROGRESS NOTES
22    Jonatan Bryant is a 24 y.o. female here for:    HPI:    Anxiety  Graduated in Psych. Stressors with job searching   Got a [address], [address] bulldog   Would like the medication increased, compliant with medications   Stopped smoking THC, but stopped in the past few weeks   Irritability, increased need for sleep and not sleeping   Racing thoughts as well  No SI or HI       BP Readings from Last 3 Encounters:   22 124/67   22 123/77   21 108/60     Current Outpatient Medications   Medication Sig Dispense Refill    sertraline (ZOLOFT) 50 MG tablet Take 1 tablet by mouth daily 30 tablet 1    hydrocortisone (ANUSOL-HC) 25 MG suppository Place 1 suppository rectally every 12 hours 20 suppository 1    levonorgestrel (NORRIS) IUD 13.5 mg 1 each by Intrauterine route once       No current facility-administered medications for this visit.       Allergies   Allergen Reactions    Amoxicillin Rash       Past Medical & Surgical History:      Diagnosis Date    Acne vulgaris 3/2/2018    Normal T, mild elevation in DHEAS    Irregular menses     Mechanical low back pain 2014     Past Surgical History:   Procedure Laterality Date    ADENOIDECTOMY      TYMPANOSTOMY TUBE PLACEMENT         Family History:      Problem Relation Age of Onset    Cancer Father         thyroid       Social History:  Social History     Tobacco Use    Smoking status: Never Smoker    Smokeless tobacco: Never Used   Substance Use Topics    Alcohol use: Never       Immunization History   Administered Date(s) Administered    COVID-19, J&J, PF, 0.5 mL 2021    COVID-19, Pfizer Purple top, DILUTE for use, 12+ yrs, 30mcg/0.3mL dose 2021    DTaP 1999, 1999, 1999, 10/10/2000    HPV Quadrivalent (Gardasil) 2014, 2014, 2015    Hepatitis A 2016    Hepatitis B 1999, 1999, 1999    Hib, unspecified 1999, 1999, 1999, 2000    Influenza, MDCK Quadv, IM, PF (Flucelvax 2 yrs and older) 11/05/2021    MMR 07/25/2000, 05/24/2004    Meningococcal MCV4P (Menactra) 09/15/2011, 07/15/2015    Pneumococcal Conjugate 13-valent (Ferol Fore) 07/25/2000, 10/10/2000    Polio IPV (IPOL) 1999, 1999, 10/10/2000    Rotavirus Vaccine 1999    Tdap (Boostrix, Adacel) 09/15/2011    Varicella (Varivax) 04/25/2000       Review of Systems   Constitutional: Negative for appetite change and fatigue. HENT: Negative for congestion and sneezing. Respiratory: Negative for cough and shortness of breath. Cardiovascular: Negative for chest pain. Gastrointestinal: Negative for abdominal pain, constipation, diarrhea, nausea and vomiting. Genitourinary: Negative for dysuria. Neurological: Negative for dizziness, numbness and headaches. Psychiatric/Behavioral: The patient is nervous/anxious. VS:  /67   Pulse 85   Temp 98.2 °F (36.8 °C) (Temporal)   Resp 16   Ht 5' 4\" (1.626 m)   Wt 153 lb (69.4 kg)   SpO2 99%   BMI 26.26 kg/m²     Physical Exam  Vitals reviewed. Constitutional:       Appearance: Normal appearance. She is not ill-appearing. HENT:      Head: Normocephalic. Mouth/Throat:      Mouth: Mucous membranes are moist.   Eyes:      Pupils: Pupils are equal, round, and reactive to light. Cardiovascular:      Rate and Rhythm: Normal rate and regular rhythm. Heart sounds: Normal heart sounds. No murmur heard. Pulmonary:      Effort: Pulmonary effort is normal.      Breath sounds: Normal breath sounds. No wheezing or rhonchi. Musculoskeletal:      Right lower leg: No edema. Left lower leg: No edema. Skin:     General: Skin is warm. Capillary Refill: Capillary refill takes less than 2 seconds. Coloration: Skin is not pale. Neurological:      Mental Status: She is alert and oriented to person, place, and time.    Psychiatric:         Mood and Affect: Mood normal. Behavior: Behavior normal.         Thought Content: Thought content normal.         Judgment: Judgment normal.      Comments: Does appear slightly more anxious today           Assessment/Plan:  1. Anxiety  Will increase Zoloft. Recheck in 6 weeks  - sertraline (ZOLOFT) 50 MG tablet; Take 1 tablet by mouth daily  Dispense: 30 tablet; Refill: 1      Return to office: Return in about 6 weeks (around 3/22/2022) for Depression/Anxiety; also needs a pap smear (different appointement) . Counseled regarding above diagnosis, including possible risks and complications, especially if left uncontrolled. I encourage you to do some reading and education about your health. The American Academy of Family Physicians provides information on many health conditions:http://familydoctor. org     Counseled regarding the possible side effects, risks, benefits and alternatives to treatment; patient and/or guardian verbalizes understanding, agrees, feels comfortable with and wishes to proceed with above treatment plan. Advised patient to call with any new medication issues, and read all Rx info from pharmacy to assure aware of all possible risks and side effects of medication before taking. Reviewed age and gender appropriate health screening exams and vaccinations. Advised patient regarding importance of keeping up with recommended health maintenance and to schedule as soon as possible if overdue, as this is important in assessing for undiagnosed pathology, especially cancer, as well as protecting against potentially harmful/life threatening disease. Patient and/or guardian verbalizes understanding and agrees with above counseling, assessment and plan.      Aleja Perales MD  Family Medicine   PGY-3

## 2022-02-09 ASSESSMENT — ENCOUNTER SYMPTOMS
ABDOMINAL PAIN: 0
SHORTNESS OF BREATH: 0
DIARRHEA: 0
COUGH: 0
CONSTIPATION: 0
NAUSEA: 0
VOMITING: 0

## 2024-04-18 NOTE — PROGRESS NOTES
Detail Level: Generalized warm.      Capillary Refill: Capillary refill takes less than 2 seconds. Neurological:      Mental Status: She is alert and oriented to person, place, and time. Psychiatric:         Attention and Perception: Attention normal.         Behavior: Behavior normal.         Thought Content: Thought content normal.         Judgment: Judgment normal.      Comments: Patient appears guarded during questioning. Assessment/Plan:  1. Anxiety  Likely due to traumatic experience and college experience. Patient needs to undergo counseling for trauma. - External Referral To Psychology  - busPIRone (BUSPAR) 7.5 MG tablet; Take 1 tablet by mouth 2 times daily  Dispense: 60 tablet; Refill: 0      Return to office: Return in about 4 weeks (around 9/14/2020) for Depression/Anxiety. Patient agrees with the above stated plan. Maxine received counseling on the following healthy behaviors: medication adherence. As above. Call or go to ED immediately if symptoms worsen or persist.  Follow up in 4 weeks regarding Anxiety, or sooner if necessary. Educational materials and/or home exercises printed for patient's review and were included in patient instructions on his/her After Visit Summary and given to patient at the end of visit. Counseled regarding above diagnosis, including possible risks and complications, especially if left uncontrolled. I encourage you to do some reading and education about your health. The American Academy of Family Physicians provides information on many health conditions:http://familydoctor. org     Counseled regarding the possible side effects, risks, benefits and alternatives to treatment; patient and/or guardian verbalizes understanding, agrees, feels comfortable with and wishes to proceed with above treatment plan.     Advised patient to call with any new medication issues, and read all Rx info from pharmacy to assure aware of all possible risks and side effects of medication before taking. Reviewed age and gender appropriate health screening exams and vaccinations. Advised patient regarding importance of keeping up with recommended health maintenance and to schedule as soon as possible if overdue, as this is important in assessing for undiagnosed pathology, especially cancer, as well as protecting against potentially harmful/life threatening disease. Patient and/or guardian verbalizes understanding and agrees with above counseling, assessment and plan.      Daniela Mccormick MD  PGY-2 Family [facility] Detail Level: Zone